# Patient Record
Sex: MALE | Race: WHITE
[De-identification: names, ages, dates, MRNs, and addresses within clinical notes are randomized per-mention and may not be internally consistent; named-entity substitution may affect disease eponyms.]

---

## 2018-10-01 ENCOUNTER — HOSPITAL ENCOUNTER (OUTPATIENT)
Dept: HOSPITAL 18 - NAV LAB | Age: 68
Discharge: HOME | End: 2018-10-01
Attending: FAMILY MEDICINE
Payer: MEDICARE

## 2018-10-01 ENCOUNTER — HOSPITAL ENCOUNTER (OUTPATIENT)
Dept: HOSPITAL 18 - NAV EKG | Age: 68
Discharge: HOME | End: 2018-10-01
Attending: FAMILY MEDICINE
Payer: MEDICARE

## 2018-10-01 DIAGNOSIS — R55: Primary | ICD-10-CM

## 2018-10-01 LAB
CK MB SERPL-MCNC: 0.6 NG/ML (ref 0–6.6)
TROPONIN I SERPL DL<=0.01 NG/ML-MCNC: (no result) NG/ML (ref ?–0.03)

## 2018-10-01 PROCEDURE — 83880 ASSAY OF NATRIURETIC PEPTIDE: CPT

## 2018-10-01 PROCEDURE — 84484 ASSAY OF TROPONIN QUANT: CPT

## 2018-10-01 PROCEDURE — 82553 CREATINE MB FRACTION: CPT

## 2018-10-01 PROCEDURE — 93005 ELECTROCARDIOGRAM TRACING: CPT

## 2020-06-22 ENCOUNTER — HOSPITAL ENCOUNTER (EMERGENCY)
Dept: HOSPITAL 18 - NAV ERS | Age: 70
Discharge: ANOTHER HOSPITAL | End: 2020-06-22
Payer: MEDICARE

## 2020-06-22 ENCOUNTER — HOSPITAL ENCOUNTER (OUTPATIENT)
Dept: HOSPITAL 92 - ERS | Age: 70
Setting detail: OBSERVATION
LOS: 1 days | Discharge: HOME | End: 2020-06-23
Attending: FAMILY MEDICINE | Admitting: FAMILY MEDICINE
Payer: MEDICARE

## 2020-06-22 VITALS — BODY MASS INDEX: 32.5 KG/M2

## 2020-06-22 DIAGNOSIS — Z79.899: ICD-10-CM

## 2020-06-22 DIAGNOSIS — Z87.891: ICD-10-CM

## 2020-06-22 DIAGNOSIS — I10: ICD-10-CM

## 2020-06-22 DIAGNOSIS — L82.1: ICD-10-CM

## 2020-06-22 DIAGNOSIS — R07.9: Primary | ICD-10-CM

## 2020-06-22 DIAGNOSIS — R06.02: ICD-10-CM

## 2020-06-22 DIAGNOSIS — R07.89: Primary | ICD-10-CM

## 2020-06-22 DIAGNOSIS — Z91.038: ICD-10-CM

## 2020-06-22 DIAGNOSIS — E78.5: ICD-10-CM

## 2020-06-22 DIAGNOSIS — N17.9: ICD-10-CM

## 2020-06-22 LAB
ALBUMIN SERPL BCG-MCNC: 4.4 G/DL (ref 3.4–4.8)
ALP SERPL-CCNC: 78 U/L (ref 40–110)
ALT SERPL W P-5'-P-CCNC: 26 U/L (ref 8–55)
ANION GAP SERPL CALC-SCNC: 16 MMOL/L (ref 10–20)
AST SERPL-CCNC: 26 U/L (ref 5–34)
BASOPHILS # BLD AUTO: 0.1 THOU/UL (ref 0–0.2)
BASOPHILS NFR BLD AUTO: 0.9 % (ref 0–1)
BILIRUB SERPL-MCNC: 0.5 MG/DL (ref 0.2–1.2)
BUN SERPL-MCNC: 10 MG/DL (ref 8.4–25.7)
CALCIUM SERPL-MCNC: 9.6 MG/DL (ref 7.8–10.44)
CHLORIDE SERPL-SCNC: 104 MMOL/L (ref 98–107)
CO2 SERPL-SCNC: 23 MMOL/L (ref 23–31)
CREAT CL PREDICTED SERPL C-G-VRATE: 0 ML/MIN (ref 70–130)
EOSINOPHIL # BLD AUTO: 0.2 THOU/UL (ref 0–0.7)
EOSINOPHIL NFR BLD AUTO: 2.2 % (ref 0–10)
GLOBULIN SER CALC-MCNC: 3.5 G/DL (ref 2.4–3.5)
GLUCOSE SERPL-MCNC: 117 MG/DL (ref 83–110)
HGB BLD-MCNC: 15 G/DL (ref 14–18)
LYMPHOCYTES # BLD AUTO: 1.8 THOU/UL (ref 1.2–3.4)
LYMPHOCYTES NFR BLD AUTO: 18.3 % (ref 21–51)
MCH RBC QN AUTO: 28.4 PG (ref 27–31)
MCV RBC AUTO: 90.6 FL (ref 78–98)
MONOCYTES # BLD AUTO: 0.9 THOU/UL (ref 0.11–0.59)
MONOCYTES NFR BLD AUTO: 8.6 % (ref 0–10)
NEUTROPHILS # BLD AUTO: 7 THOU/UL (ref 1.4–6.5)
NEUTROPHILS NFR BLD AUTO: 70 % (ref 42–75)
PLATELET # BLD AUTO: 231 THOU/UL (ref 130–400)
POTASSIUM SERPL-SCNC: 3.9 MMOL/L (ref 3.5–5.1)
RBC # BLD AUTO: 5.28 MILL/UL (ref 4.7–6.1)
SODIUM SERPL-SCNC: 139 MMOL/L (ref 136–145)
WBC # BLD AUTO: 10 THOU/UL (ref 4.8–10.8)

## 2020-06-22 PROCEDURE — 84484 ASSAY OF TROPONIN QUANT: CPT

## 2020-06-22 PROCEDURE — 80053 COMPREHEN METABOLIC PANEL: CPT

## 2020-06-22 PROCEDURE — 96361 HYDRATE IV INFUSION ADD-ON: CPT

## 2020-06-22 PROCEDURE — 83036 HEMOGLOBIN GLYCOSYLATED A1C: CPT

## 2020-06-22 PROCEDURE — 85379 FIBRIN DEGRADATION QUANT: CPT

## 2020-06-22 PROCEDURE — 93005 ELECTROCARDIOGRAM TRACING: CPT

## 2020-06-22 PROCEDURE — 80048 BASIC METABOLIC PNL TOTAL CA: CPT

## 2020-06-22 PROCEDURE — 36415 COLL VENOUS BLD VENIPUNCTURE: CPT

## 2020-06-22 PROCEDURE — 83880 ASSAY OF NATRIURETIC PEPTIDE: CPT

## 2020-06-22 PROCEDURE — 94760 N-INVAS EAR/PLS OXIMETRY 1: CPT

## 2020-06-22 PROCEDURE — 85025 COMPLETE CBC W/AUTO DIFF WBC: CPT

## 2020-06-22 PROCEDURE — 80061 LIPID PANEL: CPT

## 2020-06-22 PROCEDURE — 96360 HYDRATION IV INFUSION INIT: CPT

## 2020-06-22 PROCEDURE — 84443 ASSAY THYROID STIM HORMONE: CPT

## 2020-06-22 PROCEDURE — 71045 X-RAY EXAM CHEST 1 VIEW: CPT

## 2020-06-22 PROCEDURE — G0378 HOSPITAL OBSERVATION PER HR: HCPCS

## 2020-06-22 PROCEDURE — 93010 ELECTROCARDIOGRAM REPORT: CPT

## 2020-06-22 PROCEDURE — 96372 THER/PROPH/DIAG INJ SC/IM: CPT

## 2020-06-23 VITALS — SYSTOLIC BLOOD PRESSURE: 129 MMHG | DIASTOLIC BLOOD PRESSURE: 60 MMHG

## 2020-06-23 VITALS — TEMPERATURE: 98.1 F

## 2020-06-23 LAB
ANION GAP SERPL CALC-SCNC: 9 MMOL/L (ref 10–20)
BASOPHILS # BLD AUTO: 0 THOU/UL (ref 0–0.2)
BASOPHILS NFR BLD AUTO: 0.5 % (ref 0–1)
BUN SERPL-MCNC: 11 MG/DL (ref 8.4–25.7)
CALCIUM SERPL-MCNC: 9.3 MG/DL (ref 7.8–10.44)
CHD RISK SERPL-RTO: 3.5 (ref ?–4.5)
CHLORIDE SERPL-SCNC: 105 MMOL/L (ref 98–107)
CHOLEST SERPL-MCNC: 123 MG/DL
CO2 SERPL-SCNC: 29 MMOL/L (ref 23–31)
CREAT CL PREDICTED SERPL C-G-VRATE: 79 ML/MIN (ref 70–130)
EOSINOPHIL # BLD AUTO: 0.2 THOU/UL (ref 0–0.7)
EOSINOPHIL NFR BLD AUTO: 2.2 % (ref 0–10)
GLUCOSE SERPL-MCNC: 93 MG/DL (ref 83–110)
HDLC SERPL-MCNC: 35 MG/DL
HGB BLD-MCNC: 14.1 G/DL (ref 14–18)
LDLC SERPL CALC-MCNC: 71 MG/DL
LYMPHOCYTES # BLD: 2.1 THOU/UL (ref 1.2–3.4)
LYMPHOCYTES NFR BLD AUTO: 23.5 % (ref 21–51)
MCH RBC QN AUTO: 30 PG (ref 27–31)
MCV RBC AUTO: 89.6 FL (ref 78–98)
MONOCYTES # BLD AUTO: 0.8 THOU/UL (ref 0.11–0.59)
MONOCYTES NFR BLD AUTO: 9 % (ref 0–10)
NEUTROPHILS # BLD AUTO: 5.7 THOU/UL (ref 1.4–6.5)
NEUTROPHILS NFR BLD AUTO: 64.9 % (ref 42–75)
PLATELET # BLD AUTO: 219 THOU/UL (ref 130–400)
POTASSIUM SERPL-SCNC: 4.3 MMOL/L (ref 3.5–5.1)
RBC # BLD AUTO: 4.69 MILL/UL (ref 4.7–6.1)
SODIUM SERPL-SCNC: 139 MMOL/L (ref 136–145)
TRIGL SERPL-MCNC: 84 MG/DL (ref ?–150)
TROPONIN I SERPL DL<=0.01 NG/ML-MCNC: (no result) NG/ML (ref ?–0.03)
TROPONIN I SERPL DL<=0.01 NG/ML-MCNC: (no result) NG/ML (ref ?–0.03)
WBC # BLD AUTO: 8.8 THOU/UL (ref 4.8–10.8)

## 2020-06-23 NOTE — PDOC.FPRHP
- History of Present Illness


Chief Complaint: chest pain


History of Present Illness: 





68 yo M with HLD, HTN  transferred from [place] [facility] for ACS rule out. Patient was 

resting in chair at 6pm when had sudden onset sharp left chest pain that 

radiated to left neck. Shortly after felt hot, flushed, and lightheaded when he 

stood up.





Currently patient is chest pain free. Reported no further episodes aside from 

the one this evening. Endorsed he did have a similar episode two weeks ago. He 

reports he had a stress test done in October that was "normal." Cannot recall 

his cardiologist. 





In [place] [facility] had initial negative troponin and was given 325mg ASA and 

transferred for continued cardiac workup.


 





- Allergies/Adverse Reactions


 Allergies











Allergy/AdvReac Type Severity Reaction Status Date / Time


 


venom-wasp [wasp venom] Allergy   Verified 03/23/20 08:34














- Home Medications


 











 Medication  Instructions  Recorded  Confirmed  Type


 


Amlodipine [Norvasc] 5 mg PO DAILY 06/23/20 06/23/20 History


 


Lisinopril 30 mg PO DAILY 06/23/20 06/23/20 History


 


Rosuvastatin [Crestor] 5 mg PO DAILY 06/23/20 06/23/20 History














- History


PMHx:HTN, HLD


 


PSHx: Denies





FHx:HTN, HLD, no cardiac history


 


Social: Former smoker (4-5 cigs for 2-3 years, quit in 1968). Denies alcohol or 

drug use


 








- Review of Systems


General: denies: fever/chills, weight/appetite/sleep changes


Eyes: denies: eye pain, vision changes


ENT: denies: nasal congestion, rhinorrhea


Respiratory: denies: cough, congestion, shortness of breath


Cardiovascular: reports: chest pain, palpitation.  denies: edema, orthopnea


Gastrointestinal: reports: nausea.  denies: vomiting, abdominal pain


Skin: denies: rashes, lesions


Musculoskeletal: denies: pain, tenderness, swelling


Neurological: reports: syncope, weakness.  denies: numbness


Psychological: denies: anxiety





- Vital signs


BP: [144/75]  HR: [76] RR: [16] Tmax: [98] Pox: [100]% on [RA]  Wt: [92kg   ]   








- Physical Exam


Constitutional: NAD, awake, alert and oriented, well developed


HEENT: normocephalic and atraumatic, PERRLA, no scleral icterus, grossly normal 

vision


Neck: supple, FROM, trachea midline


Chest: no-tender to palpation, no lesions


Heart: RRR, pulses present


-Heart: 





2/6 systolic murmur


Lungs: CTAB, no respiratory distress, good air movement


Abdomen: soft, non-tender, bowel sounds present


Musculoskeletal: ROM grossly normal


Neurological: no focal deficit, CN II-XII intact, normal sensation


Skin: good turgor, capillary refill <2 seconds


-Skin: 





sebhorrheic keratosis


Psychiatric: normal mood and affect, good judgment and insight





FMR H&P: Results





- Labs


Result Diagrams: 


 06/23/20 06:05





 06/23/20 06:05





- EKG Interpretation


EKG: 





RBBB, LVH


QTc 467ms


R 93BPM





- Radiology Interpretation


  ** Chest x-ray


Status: image reviewed by me


Additional comment: 





No acute cardiopulmonary processes





FMR H&P: Upper Level





- Plan


Date/Time: 06/23/20 0003








I, [Pinky Colbert], have evaluated this patient and agree with findings/plan 

as outlined by intern resident. Pertinent changes/additions are listed here.





#. Chest pain, ACS rule out


-HEART 6, admit for further evaluation


-s/p 325mg ASA, currently patient pain free. Nitro PRN


-Tele monitoring


-Risk stratify with A1c and FLP in AM. Obtain TSH.


-Request outpatient cardiology records to review NST. 


-Trop negative x1, trend out


-Admit to tele/obs





#. Systolic murmur


-Consider TTE if no recent one in cardiology records





#. SEBASTIAN


-Cr 1.41, mIVF


-Repeat BMP in AM





#. HTN


-Stable, continue home medications





#. HLD


-Repeat FLP, resume home meds








admit/dispo: tele/obs


code: full


dvt ppx: lovenox











Addendum - Attending





- Attending Attestation


Date/Time: 06/23/20 1127





I personally evaluated the patient and discussed the management with Dr. Colbert. 


I agree with the History, Examination, Assessment and Plan documented above 

with any addition or exceptions noted below.





Patient here for ACS r/o after short episode of chest pain. Low suspicion for 

ACS. Ruled out with enzymes. Stable for discharge for further workup 

outpatient.

## 2020-06-23 NOTE — RAD
PORTABLE CHEST:

 

HISTORY: 

Chest pain.

 

FINDINGS: 

Heart size and mediastinum are within normal limits.  Some vague increased density in both lung field
s is nonspecific and may just be related to overlying soft tissue, but these could indicate subtle ar
eas of ground-glass opacity.  Clinical correlation as to any symptoms that would suggest COVID.

 

IMPRESSION: 

Some minimal possible ground-glass opacity in both lung fields.  Clinical correlation recommended.

 

POS: [facility]

## 2020-06-23 NOTE — DIS
DATE OF ADMISSION:  06/23/2020



DATE OF DISCHARGE:  06/23/2020



ADMITTING ATTENDING:  Corry Orr MD.



DISCHARGE ATTENDING:  Jose J Hernandez MD.



RESIDENT:  Mitchell De Leon DO.



CONSULTS:  None.



PROCEDURES:  None.



IMAGING:  Chest x-ray 06/22/2020.  Impression:  Some minimal possible ground-glass

opacities in both lung fields.  Clinical correlation recommended. 



PRIMARY DIAGNOSIS:  Atypical chest pain, likely secondary to musculoskeletal strain

versus esophageal spasm. 



SECONDARY DIAGNOSES:  

1. Acute kidney injury.

2. Hypertension.

3. Hyperlipidemia.



DISCHARGE MEDICATIONS:  

1. Crestor 5 mg daily. 

2. Lisinopril 30 mg daily. 

3. Amlodipine 5 mg daily.



HISTORY OF PRESENT ILLNESS AND HOSPITAL COURSE:  A 69-year-old male with past

medical history of hypertension, hyperlipidemia, transfer from [facility] for ACS

rule out.  The patient stated he was resting in his chair comfortably earlier in the

night when he suddenly experienced sharp left-sided chest pain that radiated to his

left neck.  He stated that this lasted for a few seconds.  Shortly after, the

patient developed blush and lightheadedness upon standing.  He denied any further

episodes of this or previous episodes somewhere in the past.  The patient states

that he is followed by Dr. King, for cardiology and had a recent workup earlier

in the fall that was negative.  The patient notes a history of negative stress test

as well.  In [facility], the patient had an initial negative troponin and was given a

full aspirin and transferred.  Following admission, further workup included serial

troponins that were all negative.  The patient was risk stratified with a fasting

lipid panel that was within normal limits and had an A1c of 5.3.  His TSH was also

1.2, which was within normal limits.  Following day, we discussed the case with the

patient's PCP regarding his previous history.  After review of patient's labs an ACS

risk, we discussed with the patient that it was extremely unlikely as the patient

experienced a coronary syndrome the prior night and recommended further outpatient

workup as needed. 



DISCHARGE INSTRUCTIONS:  Location:  Home. 



Diet:  Heart healthy. 



Activity:  As tolerated. 



Followup:  Follow up with PCP Dr. Ledesma within 7 days.







Job ID:  [id number]

## 2020-06-25 NOTE — EKG
Test Reason : 

Blood Pressure : ***/*** mmHG

Vent. Rate : 059 BPM     Atrial Rate : 059 BPM

   P-R Int : 188 ms          QRS Dur : 134 ms

    QT Int : 438 ms       P-R-T Axes : 048 -13 015 degrees

   QTc Int : 433 ms

 

Sinus bradycardia

Right bundle branch block

Minimal voltage criteria for LVH, may be normal variant

Abnormal ECG

When compared with ECG of 20-MAR-2015 11:56,

No significant change was found

Confirmed by DR. KRYS COHEN ([id number]) on 6/25/2020 5:38:02 PM

 

Referred By:  VINCE tolbert           Confirmed By:DR. KRYS COHEN